# Patient Record
Sex: MALE | Race: BLACK OR AFRICAN AMERICAN | Employment: OTHER | URBAN - METROPOLITAN AREA
[De-identification: names, ages, dates, MRNs, and addresses within clinical notes are randomized per-mention and may not be internally consistent; named-entity substitution may affect disease eponyms.]

---

## 2019-03-25 DIAGNOSIS — M54.30 SCIATICA, UNSPECIFIED LATERALITY: Primary | ICD-10-CM

## 2021-08-26 NOTE — PROGRESS NOTES
abdominal pain, blood in stool, constipation, diarrhea, nausea and vomiting. Genitourinary: Negative for dysuria, frequency and hematuria. Musculoskeletal: Negative for arthralgias and back pain. Skin: Negative for rash. Neurological: Negative for dizziness, weakness, light-headedness, numbness and headaches. Psychiatric/Behavioral: Negative for sleep disturbance. Lab Results   Component Value Date    WBC 7.2 05/19/2014    HGB 15.4 05/19/2014    HCT 46.6 05/19/2014    MCV 92.1 05/19/2014     05/19/2014       Chemistry        Component Value Date/Time     05/19/2014 1406    K 4.2 05/19/2014 1406     05/19/2014 1406    CO2 28 05/19/2014 1406    BUN 11 05/19/2014 1406    CREATININE 0.9 05/19/2014 1406        Component Value Date/Time    CALCIUM 9.7 05/19/2014 1406    ALKPHOS 59 05/19/2014 1406    AST 21 05/19/2014 1406    ALT 19 05/19/2014 1406    BILITOT 0.3 05/19/2014 1406          Lab Results   Component Value Date    ALT 19 05/19/2014    AST 21 05/19/2014    ALKPHOS 59 05/19/2014    BILITOT 0.3 05/19/2014     No results found for: LABA1C  No results found for: EAG    Wt Readings from Last 3 Encounters:   08/27/21 173 lb 6.4 oz (78.7 kg)   05/19/14 148 lb (67.1 kg)   08/11/10 142 lb (64.4 kg) (35 %, Z= -0.38)*     * Growth percentiles are based on CDC (Boys, 2-20 Years) data. BP Readings from Last 3 Encounters:   05/19/14 119/71   08/11/10 124/78   08/11/10 126/78     Pulse Readings from Last 3 Encounters:   08/27/21 99   08/11/10 68   08/11/10 68       Pulse 99   Temp 101.7 °F (38.7 °C)   Ht 5' 7.5\" (1.715 m)   Wt 173 lb 6.4 oz (78.7 kg)   SpO2 97%   BMI 26.76 kg/m²      Physical Exam   Unable to evaluate due to telephone w/o video encounter only    Assessment/Plan:   Verona Alvarenga was seen today for established new doctor and pharyngitis.     Diagnoses and all orders for this visit:    Encounter to establish care with new doctor    Acute pharyngitis, unspecified etiology  - dexamethasone (DECADRON) 2 MG tablet; Take 5 tablets PO x1  -     penicillin v potassium (VEETID) 500 MG tablet; Take 1 tablet by mouth 3 times daily for 10 days  -     POCT rapid strep A    Allergic to pets    Educated about 2019 novel coronavirus infection  Comments:  Informed patient that even though he is fully vaccine against COVID-19 that does not offer 100% protection against COVID-19. I reviewed the plan of care with the patient. Patient acknowledged understanding and agreed with plan of care overall. Medications Discontinued During This Encounter   Medication Reason    azithromycin (ZITHROMAX) 250 MG tablet LIST CLEANUP        General information on medications:  -When it comes to medications, whether with starting or adding a new medication or increasing the dose of a current medication, the benefits and risks have to always be considered and weighed over, especially if one is taking other medications as well. -There are no medications that have no side effects and that there is always a risk involved with taking a medication.    -If a side effect were to occur with starting a new medication or with increasing the dose of a current medication that either the medication can be totally discontinued altogether or simply decrease the dose of it and if this would be the case a follow-up appointment would be deemed necessary.    -The drug allergy list will then be updated with the corresponding side effect(s) if it's deemed to be a true 'drug allergy'.     -The most common adverse effects of medication(s) were addressed at today's visit.    -Lastly, the coverage status of a medication may vary from insurance to insurance and the only way to verify if the medication is covered is to send an actual prescription in.    -The drug formulary of each insurance changes without any warning or notification to the healthcare provider let alone the pharmacy.  -The cost of medications vary from insurance to insurance and the cost is always subject to change just like the drug formulary. Level of service (LOS):   -Multiple variables/factors are considered with determining LOS: duration of time spent with patient, time spent reviewing patient's labs, notes (including my own and other specialist's notes if relevant), number of issues addressed during the visit, answering any questions/concerns brought up by the patient, and the overall complexity and decision making regarding the issues addressed during the visit, etc.  -Length of visit is one factor (not the main one) in determining the LOS code selected. -There are different codes to distinguish new patient vs old (established) patient.  -There is a specific LOS/code for a (annual) physical (otherwise known as a wellness visit or biometric screening) can only be used once a year. In addition, the healthcare provider will determine whether the current visit can be considered a 'physical.' Discussing about specific issues/concerns, especially if those issues/concerns are new does not constitute as a 'physical' visit. Follow-up: Return if symptoms worsen or fail to improve. Patient was informed that if his or her symptoms worsen to follow up with me sooner or go to the nearest ER if the symptoms are very significant and warrant higher level of care. Regarding my note:  -This note was composed (by me only and not with assistance via a scribe) to the best of my knowledge and recollection of the encounter with the patient using one of my own customized note templates utilizing a combination of typing and dictating with the [address] [place] [address] speech recognition software.   As a result, the note may possibly have various errors (e.g. spelling, grammar, and non-sensible words/phrases/statements) despite reviewing the note prior to signing it for completion.    -It is worth noting that most of the time, progress notes are completed usually long after the patient was seen. Every effort is made to have notes as well as other things that needed to be attended to (e.g. medication refills, MyChart messages, documents that require reviewing, etc.) be addressed and completed in a timely fashion (ideally within 72 hours of the patient encounter). -It is also worth noting that healthcare providers often see several patients a day (e.g. > 15-30 patients/day) in either the outpatient and/or inpatient setting(s). In addition, healthcare providers spend a significant amount of time reviewing multiple patients' charts in preparation for their future encounters (pre-charting) as well as time spent reviewing any labs, imaging, specialist's notes (if relevant), and other documents (e.g. recent ER visits or hospital stays or completing forms such as FMLA, short-term/long-term disability), etc.  Time is also allocated to attending to patient's messages on [employer], phone calls from various people, attending to prescription refills/orders, and also attending meetings or conferences throughout the day. Time and effort is also allocated to coordinating with office staff to make sure various things are completed as part of the day-to-day office management responsibilities. For the most part, substantial portion of each given day is usually spent with direct patient care followed by documenting.  -Given all this, it is worth pointing out that not every detail of the encounter can be remembered and not everything discussed is considered relevant, significant, or noteworthy. It is also worth mentioning that my recollection of the visit may differ from the respective patient's recollection of the visit. This note is primarily written for myself (the healthcare provider) utilizing one of my own customized templates so I can recall what happened during that visit and may be used for future reference for myself to prepare for follow up appointments.   My note is also written in mind for other healthcare professionals (who have their own extensive medical background and experience) for their own understanding (of what happened during the visit) and also more importantly to hopefully help influence and play a role in formulating their plan of care along with their own unique medical expertise. If one has any questions or concerns about my given note, please take into consideration all these aforementioned things before contacting. Eran Ward M.D.   [address] [place] [address]    Electronically signed by Jasmyne Martinez M.D. on 8/27/2021 at 4:39 PM.

## 2021-08-27 ENCOUNTER — OFFICE VISIT (OUTPATIENT)
Dept: FAMILY MEDICINE CLINIC | Age: 31
End: 2021-08-27
Payer: COMMERCIAL

## 2021-08-27 VITALS
BODY MASS INDEX: 26.28 KG/M2 | WEIGHT: 173.4 LBS | TEMPERATURE: 101.7 F | HEIGHT: 68 IN | HEART RATE: 99 BPM | OXYGEN SATURATION: 97 %

## 2021-08-27 DIAGNOSIS — Z76.89 ENCOUNTER TO ESTABLISH CARE WITH NEW DOCTOR: Primary | ICD-10-CM

## 2021-08-27 DIAGNOSIS — J30.81 ALLERGIC TO PETS: ICD-10-CM

## 2021-08-27 DIAGNOSIS — Z71.89 EDUCATED ABOUT 2019 NOVEL CORONAVIRUS INFECTION: ICD-10-CM

## 2021-08-27 DIAGNOSIS — J02.9 ACUTE PHARYNGITIS, UNSPECIFIED ETIOLOGY: ICD-10-CM

## 2021-08-27 LAB — S PYO AG THROAT QL: NORMAL

## 2021-08-27 PROCEDURE — 87880 STREP A ASSAY W/OPTIC: CPT | Performed by: FAMILY MEDICINE

## 2021-08-27 PROCEDURE — 99202 OFFICE O/P NEW SF 15 MIN: CPT | Performed by: FAMILY MEDICINE

## 2021-08-27 RX ORDER — PENICILLIN V POTASSIUM 500 MG/1
500 TABLET ORAL 3 TIMES DAILY
Qty: 30 TABLET | Refills: 0 | Status: SHIPPED | OUTPATIENT
Start: 2021-08-27 | End: 2021-09-06

## 2021-08-27 RX ORDER — DEXAMETHASONE 2 MG/1
TABLET ORAL
Qty: 5 TABLET | Refills: 0 | Status: SHIPPED | OUTPATIENT
Start: 2021-08-27

## 2021-08-27 ASSESSMENT — ENCOUNTER SYMPTOMS
VOMITING: 0
COUGH: 0
WHEEZING: 0
BLOOD IN STOOL: 0
TROUBLE SWALLOWING: 1
ABDOMINAL DISTENTION: 0
SORE THROAT: 1
BACK PAIN: 0
RHINORRHEA: 0
CHEST TIGHTNESS: 0
NAUSEA: 0
ABDOMINAL PAIN: 0
SHORTNESS OF BREATH: 0
DIARRHEA: 0
CONSTIPATION: 0
SINUS PRESSURE: 0
VOICE CHANGE: 1

## 2021-08-27 ASSESSMENT — PATIENT HEALTH QUESTIONNAIRE - PHQ9
SUM OF ALL RESPONSES TO PHQ9 QUESTIONS 1 & 2: 0
2. FEELING DOWN, DEPRESSED OR HOPELESS: 0
SUM OF ALL RESPONSES TO PHQ QUESTIONS 1-9: 0
SUM OF ALL RESPONSES TO PHQ QUESTIONS 1-9: 0
1. LITTLE INTEREST OR PLEASURE IN DOING THINGS: 0
SUM OF ALL RESPONSES TO PHQ QUESTIONS 1-9: 0

## 2022-10-14 ENCOUNTER — OFFICE VISIT (OUTPATIENT)
Dept: FAMILY MEDICINE CLINIC | Age: 32
End: 2022-10-14
Payer: COMMERCIAL

## 2022-10-14 VITALS
RESPIRATION RATE: 18 BRPM | BODY MASS INDEX: 28.49 KG/M2 | WEIGHT: 188 LBS | SYSTOLIC BLOOD PRESSURE: 120 MMHG | OXYGEN SATURATION: 98 % | TEMPERATURE: 98.4 F | DIASTOLIC BLOOD PRESSURE: 80 MMHG | HEART RATE: 66 BPM | HEIGHT: 68 IN

## 2022-10-14 DIAGNOSIS — Z00.00 ANNUAL PHYSICAL EXAM: ICD-10-CM

## 2022-10-14 DIAGNOSIS — S99.921A INJURY OF RIGHT FOOT, INITIAL ENCOUNTER: ICD-10-CM

## 2022-10-14 DIAGNOSIS — Z23 NEED FOR VACCINATION: Primary | ICD-10-CM

## 2022-10-14 PROCEDURE — 99395 PREV VISIT EST AGE 18-39: CPT | Performed by: FAMILY MEDICINE

## 2022-10-14 PROCEDURE — 90471 IMMUNIZATION ADMIN: CPT | Performed by: FAMILY MEDICINE

## 2022-10-14 PROCEDURE — 81002 URINALYSIS NONAUTO W/O SCOPE: CPT | Performed by: FAMILY MEDICINE

## 2022-10-14 PROCEDURE — 90674 CCIIV4 VAC NO PRSV 0.5 ML IM: CPT | Performed by: FAMILY MEDICINE

## 2022-10-14 NOTE — PROGRESS NOTES
Subjective:       Lesli Frazier is a 29 y.o. male and is here for a comprehensive physical exam.  The patient reports problems -patient reports that he dropped a dumbbell on his right great toe several days ago. He has some pain in the toe with walking but has not been bad enough for him to take any medication. He denies any numbness. - civil/criminal    Exercise- 6-7 days a week  Alcohol- once every 2 weeks    No tobacco  No drugs  Not  , no children  Do you take any herbs or supplements that were not prescribed by a doctor? no  Are you taking calcium supplements? no  Are you taking aspirin daily? no    Review of Systems  Do you have pain that bothers you in your daily life? no  Pertinent items are noted in HPI. Objective:      /80   Pulse 66   Temp 98.4 °F (36.9 °C)   Resp 18   Ht 5' 8\" (1.727 m)   Wt 188 lb (85.3 kg)   SpO2 98%   BMI 28.59 kg/m²   General appearance: alert, appears stated age, and cooperative  Neck: no adenopathy, supple, symmetrical, trachea midline, and thyroid not enlarged, symmetric, no tenderness/mass/nodules  Lungs: clear to auscultation bilaterally  Heart: regular rate and rhythm  Abdomen: soft, non-tender; bowel sounds normal; no masses,  no organomegaly  Male genitalia: deferred per patient  Extremities: extremities normal, atraumatic, no cyanosis or edema and right foot-there is some swelling and erythema and bruising along the right great toe. No gross bony abnormalities. No open sores. Normal range of motion and sensation. Lymph nodes: Cervical, supraclavicular, and axillary nodes normal.  Neurologic: Grossly normal      Assessment:      Healthy male exam.     Right great toe contusion  Plan:      1.  CPE labs  Flu and tetanus vaccine    Kevan tape for immobilization of right great toe. Ice rest and elevate    X-ray with instructions  2.  Patient Counseling:  --Nutrition: Stressed importance of moderation in sodium/caffeine intake, saturated fat and cholesterol, caloric balance, sufficient intake of fresh fruits, vegetables, fiber, calcium, iron, and 1 mg of folate supplement per day (for females capable of pregnancy). --Discussed the issue of estrogen replacement, calcium supplement, and the daily use of baby aspirin. --Exercise: Stressed the importance of regular exercise. --Injury prevention: Discussed safety belts, safety helmets, smoke detector, smoking near bedding or upholstery. --Dental health: Discussed importance of regular tooth brushing, flossing, and dental visits. --Immunizations reviewed. .  --After hours service discussed with patient    3.  Follow up as needed for acute illness

## 2022-10-18 PROCEDURE — 90714 TD VACC NO PRESV 7 YRS+ IM: CPT | Performed by: FAMILY MEDICINE
